# Patient Record
(demographics unavailable — no encounter records)

---

## 2025-02-24 NOTE — REVIEW OF SYSTEMS
[Palpitations] : palpitations [SOB on Exertion] : shortness of breath during exertion [Dizziness] : dizziness [Anxiety] : anxiety [Negative] : Allergic/Immunologic [FreeTextEntry5] : on and off  [de-identified] : on and off

## 2025-02-24 NOTE — HISTORY OF PRESENT ILLNESS
[Disease:__________________________] : Disease: [unfilled] [de-identified] : 40 yo F presents to the office today for initial consultation for Anemia. Referred by Self. Pt has a PMHx of Post Partum Anxiety,  Pt has a PSHx D&C, Appendectomy, Left Ovary removal. Pt has a FHx of Mother Uterine Cancer, Breast Cancer, Lung Nodule Cancer, Father Hypertension  Social History: socially drinker, non-smoker, , 1 child, Nurse Transitional Care team    This is the first time she was told she was anemic She was first time during her pregnancy  She had a post-Partum Hemorrhage after giving birth  She states her Fertility doctor wants us to also check for Coagulation Abnormalities, such as PT/APPT, Factor 5 Liden  She has taken PO Iron before and is tolerating  She had a BLT and Iron Infusion in 3/2024 and received 2 units of pRBC and 2 Iron Infusions  Menstrual cycle- irregular, before the baby it was 5-7 days, now 10+ days, before 3 days now it's almost everyday of heavy bleeding Medication- See Med list  Allergies- PCN theresa, Cipro Rash  Diet- Has a well balance diet eating green vegetables, red meats   Pt states SOB on exertion, dizziness on and off, palpitations on and off  Pt denies fever, diarrhea, fatigue, chills, nausea, vomiting, dyspnea, unintentional weight loss or bleeding. Pt denies headache, weakness, Pica.  Pt has not seen any blood in the stools or melena. No epistaxis, hematemesis or hemoptysis.   Healthcare Maintenace: PCP- Dr.Girgis JOHN-   GI- Will find one  Colonoscopy- never Upper Endoscopy- - Hital Hernia  Mammography- Due for one  GYN Pelvic Exam/pap- 2025 Breast Exam- 2024, self  LMP- 25  GPA- A4 (4 miscarriages around 8-10 weeks)   Labs on 25 reviewed and discussed with patient. WBC 6.7, Hgb 10, Hct 33.4, , MCV 83 Ferritin 18, Iron Serum 28, Iron sat 7, TIBC 426, UIBC 398, B12 573, Folate 20

## 2025-02-24 NOTE — HISTORY OF PRESENT ILLNESS
[Disease:__________________________] : Disease: [unfilled] [de-identified] : 40 yo F presents to the office today for initial consultation for Anemia. Referred by Self. Pt has a PMHx of Post Partum Anxiety,  Pt has a PSHx D&C, Appendectomy, Left Ovary removal. Pt has a FHx of Mother Uterine Cancer, Breast Cancer, Lung Nodule Cancer, Father Hypertension  Social History: socially drinker, non-smoker, , 1 child, Nurse Transitional Care team    This is the first time she was told she was anemic She was first time during her pregnancy  She had a post-Partum Hemorrhage after giving birth  She states her Fertility doctor wants us to also check for Coagulation Abnormalities, such as PT/APPT, Factor 5 Liden  She has taken PO Iron before and is tolerating  She had a BLT and Iron Infusion in 3/2024 and received 2 units of pRBC and 2 Iron Infusions  Menstrual cycle- irregular, before the baby it was 5-7 days, now 10+ days, before 3 days now it's almost everyday of heavy bleeding Medication- See Med list  Allergies- PCN theresa, Cipro Rash  Diet- Has a well balance diet eating green vegetables, red meats   Pt states SOB on exertion, dizziness on and off, palpitations on and off  Pt denies fever, diarrhea, fatigue, chills, nausea, vomiting, dyspnea, unintentional weight loss or bleeding. Pt denies headache, weakness, Pica.  Pt has not seen any blood in the stools or melena. No epistaxis, hematemesis or hemoptysis.   Healthcare Maintenace: PCP- Dr.Girgis JOHN-   GI- Will find one  Colonoscopy- never Upper Endoscopy- - Hital Hernia  Mammography- Due for one  GYN Pelvic Exam/pap- 2025 Breast Exam- 2024, self  LMP- 25  GPA- A4 (4 miscarriages around 8-10 weeks)   Labs on 25 reviewed and discussed with patient. WBC 6.7, Hgb 10, Hct 33.4, , MCV 83 Ferritin 18, Iron Serum 28, Iron sat 7, TIBC 426, UIBC 398, B12 573, Folate 20

## 2025-02-24 NOTE — ASSESSMENT
[FreeTextEntry1] :  38 yo F presents to the office today for initial consultation for Anemia. Referred by Self. This is the first time she was told she was anemic. She was first time during her pregnancy. She had a post-Partum Hemorrhage after giving birth. She states her Fertility doctor wants us to also check for Coagulation Abnormalities, such as PT/APPT, Factor 5 Liden. She has taken PO Iron before and is tolerating. She had a BLT and Iron Infusion in 3/2024 and received 2 units of pRBC and 2 Iron Infusions. Menstrual cycle- irregular, before the baby it was 5-7 days, now 10+ days, before 3 days now it's almost everyday of heavy bleeding. Diet- Has a well balance diet eating green vegetables, red meats. Pt states SOB on exertion, dizziness on and off, palpitations on and off.    Impression: Anemia    Plan: Previous chart and previous labs reviewed. Labs on 2/5/25 reviewed and discussed with patient. WBC 6.7, Hgb 10, Hct 33.4, , MCV 83 Ferritin 18, Iron Serum 28, Iron sat 7, TIBC 426, UIBC 398, B12 573, Folate 20. --- Today ordered labs: PT, INR, PTT, Factor V Liden, Antithrombin III Assay, DRVVT, PT gene, Silica Clotting Time --- Labs to be done 3 days when they RTC: CBC, Ferritin, TIBC  --- Schedule for Iron Infusion Venofer 200mg IV weekly x5  --- Will call pt with lab results  --- Pt was educated on a high iron diet --- Pt advised to follow up with their OBGYN, PCP, GI as directed We explained possible side effect from Iron infusion is not limited to anaphylactic reaction, headache, hives, itching wheezing, difficulty breathing, a lightheaded feeling, swelling of face, lips, tongue or throat, delay reaction and abdominal discomfort. All questions were answered   RTC in 3 months with ERICH Carlos

## 2025-02-24 NOTE — REVIEW OF SYSTEMS
[Palpitations] : palpitations [SOB on Exertion] : shortness of breath during exertion [Dizziness] : dizziness [Anxiety] : anxiety [Negative] : Allergic/Immunologic [FreeTextEntry5] : on and off  [de-identified] : on and off

## 2025-02-24 NOTE — ASSESSMENT
[FreeTextEntry1] :  40 yo F presents to the office today for initial consultation for Anemia. Referred by Self. This is the first time she was told she was anemic. She was first time during her pregnancy. She had a post-Partum Hemorrhage after giving birth. She states her Fertility doctor wants us to also check for Coagulation Abnormalities, such as PT/APPT, Factor 5 Liden. She has taken PO Iron before and is tolerating. She had a BLT and Iron Infusion in 3/2024 and received 2 units of pRBC and 2 Iron Infusions. Menstrual cycle- irregular, before the baby it was 5-7 days, now 10+ days, before 3 days now it's almost everyday of heavy bleeding. Diet- Has a well balance diet eating green vegetables, red meats. Pt states SOB on exertion, dizziness on and off, palpitations on and off.    Impression: Anemia    Plan: Previous chart and previous labs reviewed. Labs on 2/5/25 reviewed and discussed with patient. WBC 6.7, Hgb 10, Hct 33.4, , MCV 83 Ferritin 18, Iron Serum 28, Iron sat 7, TIBC 426, UIBC 398, B12 573, Folate 20. --- Today ordered labs: PT, INR, PTT, Factor V Liden, Antithrombin III Assay, DRVVT, PT gene, Silica Clotting Time --- Labs to be done 3 days when they RTC: CBC, Ferritin, TIBC  --- Schedule for Iron Infusion Venofer 200mg IV weekly x5  --- Will call pt with lab results  --- Pt was educated on a high iron diet --- Pt advised to follow up with their OBGYN, PCP, GI as directed We explained possible side effect from Iron infusion is not limited to anaphylactic reaction, headache, hives, itching wheezing, difficulty breathing, a lightheaded feeling, swelling of face, lips, tongue or throat, delay reaction and abdominal discomfort. All questions were answered   RTC in 3 months with ERICH Carlos

## 2025-02-26 NOTE — DISCUSSION/SUMMARY
[FreeTextEntry1] : REASON FOR VISIT: Ms. Monica Yung is a 39-year-old female who was referred by Dr. Mel Hardy for cancer genetic counseling and risk assessment due to a family history of cancer. The patient was seen on 2025 through [facility]. The patient was unaccompanied.   RELEVANT MEDICAL AND SURGICAL HISTORY: Patient had been experiencing heavy menses. Placental site nodule noted in  or 10/2025 leftover after her daughter's pregnancy. She had a D&C 10/28/25 which was negative but continued bleeding. She continued bleeding after the D&C, was put on Progesterone which helped with the bleeding. Also past history of 5 D&Cs for uterine polyps. She has a history of IVF transfers which were unsuccessful but she was able to conceive her daughter naturally. She desires to have more children and has frozen embryos from when she was 34.  OTHER MEDICAL AND SURGICAL HISTORY: - Unilateral oophorectomy age 13 for large ovarian cyst which led to ovarian torsion  - Appendectomy - Postpartum hemorrhage and had blood transfusion 1 year ago, 2-3 subsequent iron infusions - Postpartum anxiety and hypertension   PAST OB/GYN HISTORY: Obstetrical History:  Had 3-4 early chemical pregnancies and conceived naturally. Embryos frozen at age 34. Age at Menarche: 12 Pre-Menopausal Age at First Live Birth: 38 Oral Contraceptive Use: Former use for 10 years after ovary was removed at age 13  Hormone Replacement Therapy: Progesterone, and IVF treatments   CANCER SCREENING HISTORY: Breast: Last clinical breast exam was several years ago. No breast imaging. GYN: See comments above. Colon: Denied  Skin: Moles removed per patient benign.   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   FAMILY HISTORY: Paternal ancestry was reported as Guamanian/[place] Timorese/Wolof/English and maternal ancestry was reported as Polish. A detailed family history of cancer was ascertained, see below for pedigree. Of note: - Mother with endometrial cancer at 53, breast cancer at 68 and neuroendocrine lung cancer at 74 -  Maternal uncle with colon cancer at 55 - Maternal aunt with brain cancer at 57 - Maternal grandmother with gallbladder cancer - Maternal great grandfather colon cancer  age 63 - Maternal great aunt breast cancer 70s - Maternal great uncle pancreatic cancer  age 49 - Maternal gale cousin-once-removed with rare stomach cancer  age 48 - Maternal female cousin-once-removed with sarcoma uterine cancer  age 53 - Maternal female cousin-once-removed with breast cancer dx age 66  -  Paternal uncle with prostate cancer at 70 -  Paternal grandmother with breast cancer in her 70s/80s and colon cancer at 86   The remaining family history is unremarkable. According to the patient there are no other known cases of significant cancers in the family. To her knowledge no one else in the family has had germline testing for cancer susceptibility.   RISK ASSESSMENT: Ms. Yung's family history of cancer is suggestive of an inherited predisposition to breast, uterine and colorectal cancer and related cancers. Variants in breast, uterine and colorectal cancer susceptibility genes were of specific concern. The patient was encouraged to fwd along her mother's genetic report. If her mother tested negative for a comprehensive panel 8y ago then there wouldn't be anything to test her for from her maternal family. The patient said she would try to obtain the report but still desired to pursue testing. This test analyzes the following genes: AIP, ALK, APC, SOY, AXIN2, BAP1, BARD1, BMPR1A, BRCA1, BRCA2, BRIP1, CDC73, CDH1, CDK4, CDKN1B, CDKN2A, CHEK2, CTNNA1, DICER1, EGFR, EGLN1, EPCAM, FH, FLCN, GREM1, HOXB13, KIF1B, KIT, LZTR1, MAX, MEN1, MET, MITF, MLH1, MSH2, MSH3, MSH6, MUTYH, NF1, NF2, NTHL1, PALB2, PDGFRA, PHOX2B, PMS2, POLD1, POLE, POT1, UQJFD9S, PTCH1, PTEN, RAD51C, RAD51D, RB1, RET, SDHA, SDHAF2, SDHB, SDHC, SDHD, SMAD4, SMARCA4, SMARCB1, SMARCE1, STK11, SUFU, [id number], TP53, TSC1, TSC2, VHL   We discussed the risks, benefits and limitations, financial cost and implications of genetic testing. We also discussed the psychosocial implications of genetic testing. Possible test results were reviewed with the patient, along with associated medical management options.   The patient consented to genetic testing for hereditary cancer. A sample was obtained from the patient in our clinic and will be sent to [facility] for analysis.   PLAN: 1. The patient's sample will be sent to [facility] for analysis. 2. We will contact the patient once the results are available. Results generally return in 2-3 weeks.   For any additional questions please call Cancer Genetics at [phone] or [phone].     Qing Duran MS, [facility] [place] Genetic Counselor, Cancer Genetics

## 2025-02-26 NOTE — DISCUSSION/SUMMARY
[FreeTextEntry1] : REASON FOR VISIT: Ms. Monica Yung is a 39-year-old female who was referred by Dr. Mel Hardy for cancer genetic counseling and risk assessment due to a family history of cancer. The patient was seen on 2025 through [facility]. The patient was unaccompanied.   RELEVANT MEDICAL AND SURGICAL HISTORY: Patient had been experiencing heavy menses. Placental site nodule noted in  or 10/2025 leftover after her daughter's pregnancy. She had a D&C 10/28/25 which was negative but continued bleeding. She continued bleeding after the D&C, was put on Progesterone which helped with the bleeding. Also past history of 5 D&Cs for uterine polyps. She has a history of IVF transfers which were unsuccessful but she was able to conceive her daughter naturally. She desires to have more children and has frozen embryos from when she was 34.  OTHER MEDICAL AND SURGICAL HISTORY: - Unilateral oophorectomy age 13 for large ovarian cyst which led to ovarian torsion  - Appendectomy - Postpartum hemorrhage and had blood transfusion 1 year ago, 2-3 subsequent iron infusions - Postpartum anxiety and hypertension   PAST OB/GYN HISTORY: Obstetrical History:  Had 3-4 early chemical pregnancies and conceived naturally. Embryos frozen at age 34. Age at Menarche: 12 Pre-Menopausal Age at First Live Birth: 38 Oral Contraceptive Use: Former use for 10 years after ovary was removed at age 13  Hormone Replacement Therapy: Progesterone, and IVF treatments   CANCER SCREENING HISTORY: Breast: Last clinical breast exam was several years ago. No breast imaging. GYN: See comments above. Colon: Denied  Skin: Moles removed per patient benign.   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   FAMILY HISTORY: Paternal ancestry was reported as Salvadorean/Emirati/Greek/English and maternal ancestry was reported as Polish. A detailed family history of cancer was ascertained, see below for pedigree. Of note: - Mother with endometrial cancer at 53, breast cancer at 68 and neuroendocrine lung cancer at 74 -  Maternal uncle with colon cancer at 55 - Maternal aunt with brain cancer at 57 - Maternal grandmother with gallbladder cancer - Maternal great grandfather colon cancer  age 63 - Maternal great aunt breast cancer 70s - Maternal great uncle pancreatic cancer  age 49 - Maternal gale cousin-once-removed with rare stomach cancer  age 48 - Maternal female cousin-once-removed with sarcoma uterine cancer  age 53 - Maternal female cousin-once-removed with breast cancer dx age 66  -  Paternal uncle with prostate cancer at 70 -  Paternal grandmother with breast cancer in her 70s/80s and colon cancer at 86   The remaining family history is unremarkable. According to the patient there are no other known cases of significant cancers in the family. To her knowledge no one else in the family has had germline testing for cancer susceptibility.   RISK ASSESSMENT: Ms. Yung's family history of cancer is suggestive of an inherited predisposition to breast, uterine and colorectal cancer and related cancers. Variants in breast, uterine and colorectal cancer susceptibility genes were of specific concern. The patient was encouraged to fwd along her mother's genetic report. If her mother tested negative for a comprehensive panel 8y ago then there wouldn't be anything to test her for from her maternal family. The patient said she would try to obtain the report but still desired to pursue testing. This test analyzes the following genes: AIP, ALK, APC, SOY, AXIN2, BAP1, BARD1, BMPR1A, BRCA1, BRCA2, BRIP1, CDC73, CDH1, CDK4, CDKN1B, CDKN2A, CHEK2, CTNNA1, DICER1, EGFR, EGLN1, EPCAM, FH, FLCN, GREM1, HOXB13, KIF1B, KIT, LZTR1, MAX, MEN1, MET, MITF, MLH1, MSH2, MSH3, MSH6, MUTYH, NF1, NF2, NTHL1, PALB2, PDGFRA, PHOX2B, PMS2, POLD1, POLE, POT1, [id number], PTCH1, PTEN, RAD51C, RAD51D, RB1, RET, SDHA, SDHAF2, SDHB, SDHC, SDHD, SMAD4, SMARCA4, SMARCB1, SMARCE1, STK11, SUFU, [id number], TP53, TSC1, TSC2, VHL   We discussed the risks, benefits and limitations, financial cost and implications of genetic testing. We also discussed the psychosocial implications of genetic testing. Possible test results were reviewed with the patient, along with associated medical management options.   The patient consented to genetic testing for hereditary cancer. A sample was obtained from the patient in our clinic and will be sent to [facility] for analysis.   PLAN: 1. The patient's sample will be sent to [facility] for analysis. 2. We will contact the patient once the results are available. Results generally return in 2-3 weeks.   For any additional questions please call Cancer Genetics at [phone] or [phone].     Qing Duran MS, [facility] [place] Genetic Counselor, Cancer Genetics

## 2025-03-27 NOTE — DISCUSSION/SUMMARY
[FreeTextEntry1] : REASON FOR VISIT: Ms. Monica Yung is a 39-year-old female who was called for a discussion regarding her negative genetic test results related to hereditary cancer predisposition. The patient did not answer the phone and a voicemail was left for her requesting a callback.     RELEVANT MEDICAL AND SURGICAL HISTORY: Patient had been experiencing heavy menses. Placental site nodule noted in  or 10/2025 leftover after her daughter's pregnancy. She had a D&C 10/28/25 which was negative but continued bleeding. She continued bleeding after the D&C, was put on Progesterone which helped with the bleeding. Also past history of 5 D&Cs for uterine polyps. She has a history of IVF transfers which were unsuccessful but she was able to conceive her daughter naturally. She desires to have more children and has frozen embryos from when she was 34.  OTHER MEDICAL AND SURGICAL HISTORY: - Unilateral oophorectomy age 13 for large ovarian cyst which led to ovarian torsion - Appendectomy - Postpartum hemorrhage and had blood transfusion 1 year ago, 2-3 subsequent iron infusions - Postpartum anxiety and hypertension  PAST OB/GYN HISTORY: Obstetrical History:  Had 3-4 early chemical pregnancies and conceived naturally. Embryos frozen at age 34. Age at Menarche: 12 Pre-Menopausal Age at First Live Birth: 38 Oral Contraceptive Use: Former use for 10 years after ovary was removed at age 13 Hormone Replacement Therapy: Progesterone, and IVF treatments  CANCER SCREENING HISTORY: Breast: Last clinical breast exam was several years ago. No breast imaging. GYN: See comments above. Colon: Denied Skin: Moles removed per patient benign.  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  FAMILY HISTORY: Paternal ancestry was reported as Macedonian/Macedonian/Luxembourgish/English and maternal ancestry was reported as Polish. A detailed family history of cancer was ascertained, see below for pedigree. Of note: - Mother with endometrial cancer at 53, breast cancer at 68 and neuroendocrine lung cancer at 74 - Maternal uncle with colon cancer at 55 - Maternal aunt with brain cancer at 57 - Maternal grandmother with gallbladder cancer - Maternal great grandfather colon cancer  age 63 - Maternal great aunt breast cancer 70s - Maternal great uncle pancreatic cancer  age 49 - Maternal gale cousin-once-removed with rare stomach cancer  age 48 - Maternal female cousin-once-removed with sarcoma uterine cancer  age 53 - Maternal female cousin-once-removed with breast cancer dx age 66 - Paternal uncle with prostate cancer at 70 - Paternal grandmother with breast cancer in her 70s/80s and colon cancer at 86  The remaining family history is unremarkable. According to the patient there are no other known cases of significant cancers in the family. To her knowledge no one else in the family has had germline testing for cancer susceptibility.    TEST RESULTS: NEGATIVE   NO pathogenic (disease-causing) variants or variants of uncertain significance were detected in the following genes:  AIP, ALK, APC, SOY, AXIN2, BAP1, BARD1, BMPR1A, BRCA1, BRCA2, BRIP1, CDC73, CDH1, CDK4, CDKN1B, CDKN2A, CHEK2, CTNNA1, DICER1, EGFR, EGLN1, EPCAM, FH, FLCN, GREM1, HOXB13, KIF1B, KIT, LZTR1, MAX, MEN1, MET, MITF, MLH1, MSH2, MSH3, MSH6, MUTYH, NF1, NF2, NTHL1, PALB2, PDGFRA, PHOX2B, PMS2, POLD1, POLE, POT1, GVVHJ2V, PTCH1, PTEN, RAD51C, RAD51D, RB1, RET, SDHA, SDHAF2, SDHB, SDHC, SDHD, SMAD4, SMARCA4, SMARCB1, SMARCE1, STK11, SUFU, [id number], TP53, TSC1, TSC2, VHL   RESULTS INTERPRETATION AND ASSESSMENT: Given Ms. Yung's current reported family history of cancer, and her negative genetic test results, the following screening guidelines and risk-reducing recommendations were discussed: Breast: Despite her negative genetic test results, given Ms. Yung's reported family history and additional factors (e.g. OBGYN history, weight), she is thought to be at an increased risk for breast cancer (breast density was unavailable to include in the risk model). CHERI risk assessment demonstrates a 4.5% 10-year risk and 30.0% lifetime risk. This puts her in the high-risk category for breast cancer because she has a lifetime risk greater than 20%. Given this risk, we the patient may consider undergoing annual breast MRI in addition to annual screening mammogram with consideration of tomosynthesis. We also recommend she practice breast self-awareness. Patient was offered a consultation with Dr. Irby (Cancer Genetics Doctor) to review recommendations and create a management plan. The schedulers will reach out to the patient to set up a follow-up appointment. Complete note with recommendations will be dictated after the follow-up. Other: In the absence of other indications, the patient should practice age-appropriate cancer screening for all other organ systems as recommended for the general population.   It is recommended that the patient discuss the importance of pursuing cancer genetic testing and counseling with her other relatives. There may be a pathogenic variant in the family which the patient did not inherit. We would be happy to meet with her family members or refer them to a genetic expert in their area.   We also discussed that, while no clear cause of the patient's family history of cancer was identified, this result, while reassuring, does not entirely rule out a hereditary cancer risk in the patient. It is possible the patient has a mutation in one of the genes tested that is not detectable by this analysis, or has a mutation in a different gene, either known or unknown. It is also possible there is a hereditary cancer predisposition in the family, but the patient did not inherit it.   Our knowledge of genetics and inherited cancer conditions is changing rapidly, therefore, we recommend that the patient contact our office, every 2 to 3 years, to discuss relevant advances in cancer genetics. We emphasize the importance of re-contacting us with updates regarding her personal and family history of cancer as well as any updates regarding additional cancer genetic test results performed for the patient and/or family members.  Such updates could possibly change our risk assessment and recommendations.   PLAN: 1. Long-term management and surveillance should be based on the patient's personal and family history and general population guidelines for all other cancers. 2. A copy of the genetic test results is available to the patient in the [facility] portal. 3. The patient was encouraged to contact us every 2-3 years to discuss relevant advances in cancer genetics, or sooner if there are any changes in her personal or family history of cancer.   For any additional questions please call Cancer Genetics at [phone] or [phone].   Qing Duran MS, [facility] [place] Genetic Counselor, Cancer Genetics

## 2025-05-20 NOTE — HISTORY OF PRESENT ILLNESS
[Disease:__________________________] : Disease: [unfilled] [de-identified] : 40 yo F presents to the office today for initial consultation for Anemia. Referred by Self. Pt has a PMHx of Post Partum Anxiety,  Pt has a PSHx D&C, Appendectomy, Left Ovary removal. Pt has a FHx of Mother Uterine Cancer, Breast Cancer, Lung Nodule Cancer, Father Hypertension  Social History: socially drinker, non-smoker, , 1 child, Nurse Transitional Care team    This is the first time she was told she was anemic She was first time during her pregnancy  She had a post-Partum Hemorrhage after giving birth  She states her Fertility doctor wants us to also check for Coagulation Abnormalities, such as PT/APPT, Factor 5 Liden  She has taken PO Iron before and is tolerating  She had a BLT and Iron Infusion in 3/2024 and received 2 units of pRBC and 2 Iron Infusions  Menstrual cycle- irregular, before the baby it was 5-7 days, now 10+ days, before 3 days now it's almost everyday of heavy bleeding Medication- See Med list  Allergies- PCN theresa, Cipro Rash  Diet- Has a well balance diet eating green vegetables, red meats   Pt states SOB on exertion, dizziness on and off, palpitations on and off  Pt denies fever, diarrhea, fatigue, chills, nausea, vomiting, dyspnea, unintentional weight loss or bleeding. Pt denies headache, weakness, Pica.  Pt has not seen any blood in the stools or melena. No epistaxis, hematemesis or hemoptysis.   Healthcare Maintenace: PCP- Dr.Girgis JOHN-   GI- Will find one  Colonoscopy- never Upper Endoscopy- - Hital Hernia  Mammography- Due for one  GYN Pelvic Exam/pap- 2025 Breast Exam- 2024, self  LMP- 25  GPA- A4 (4 miscarriages around 8-10 weeks)   Labs on 25 reviewed and discussed with patient. WBC 6.7, Hgb 10, Hct 33.4, , MCV 83 Ferritin 18, Iron Serum 28, Iron sat 7, TIBC 426, UIBC 398, B12 573, Folate 20 [de-identified] : 5/19/25 41 yo F presents to the office today for follow up for Anemia s/p Venofer 200mg IV weekly x5, 5/5, last dose on 4/1/25  She is feeling much better after the infusions She is complaing of acid reflux whixh she is going to see GI for She is getting a script from OBGYN to do a Mammogram LMP- 4/19/25, lasted 5 days  Pt states mild fatigue  Pt denies fever, diarrhea, chills, nausea, vomiting, SOB, dyspnea, unintentional weight loss or bleeding. Pt denies palpitations, headache, weakness, dizziness, Pica.  Pt has not seen any blood in the stools or melena. No epistaxis, hematemesis or hemoptysis. Labs on 5/16/25 reviewed and discussed with patient. WBC 7.18, Hgb 12.9, Hct 38.5, , MCV 83.5 Ferritin 141, Iron Serum 85, Iron sat 27, TIBC 317, UIBC 232

## 2025-05-20 NOTE — HISTORY OF PRESENT ILLNESS
[Disease:__________________________] : Disease: [unfilled] [de-identified] : 38 yo F presents to the office today for initial consultation for Anemia. Referred by Self. Pt has a PMHx of Post Partum Anxiety,  Pt has a PSHx D&C, Appendectomy, Left Ovary removal. Pt has a FHx of Mother Uterine Cancer, Breast Cancer, Lung Nodule Cancer, Father Hypertension  Social History: socially drinker, non-smoker, , 1 child, Nurse Transitional Care team    This is the first time she was told she was anemic She was first time during her pregnancy  She had a post-Partum Hemorrhage after giving birth  She states her Fertility doctor wants us to also check for Coagulation Abnormalities, such as PT/APPT, Factor 5 Liden  She has taken PO Iron before and is tolerating  She had a BLT and Iron Infusion in 3/2024 and received 2 units of pRBC and 2 Iron Infusions  Menstrual cycle- irregular, before the baby it was 5-7 days, now 10+ days, before 3 days now it's almost everyday of heavy bleeding Medication- See Med list  Allergies- PCN theresa, Cipro Rash  Diet- Has a well balance diet eating green vegetables, red meats   Pt states SOB on exertion, dizziness on and off, palpitations on and off  Pt denies fever, diarrhea, fatigue, chills, nausea, vomiting, dyspnea, unintentional weight loss or bleeding. Pt denies headache, weakness, Pica.  Pt has not seen any blood in the stools or melena. No epistaxis, hematemesis or hemoptysis.   Healthcare Maintenace: PCP- Dr.Girgis JOHN-   GI- Will find one  Colonoscopy- never Upper Endoscopy- - Hital Hernia  Mammography- Due for one  GYN Pelvic Exam/pap- 2025 Breast Exam- 2024, self  LMP- 25  GPA- A4 (4 miscarriages around 8-10 weeks)   Labs on 25 reviewed and discussed with patient. WBC 6.7, Hgb 10, Hct 33.4, , MCV 83 Ferritin 18, Iron Serum 28, Iron sat 7, TIBC 426, UIBC 398, B12 573, Folate 20 [de-identified] : 5/19/25 41 yo F presents to the office today for follow up for Anemia s/p Venofer 200mg IV weekly x5, 5/5, last dose on 4/1/25  She is feeling much better after the infusions She is complaing of acid reflux whixh she is going to see GI for She is getting a script from OBGYN to do a Mammogram LMP- 4/19/25, lasted 5 days  Pt states mild fatigue  Pt denies fever, diarrhea, chills, nausea, vomiting, SOB, dyspnea, unintentional weight loss or bleeding. Pt denies palpitations, headache, weakness, dizziness, Pica.  Pt has not seen any blood in the stools or melena. No epistaxis, hematemesis or hemoptysis. Labs on 5/16/25 reviewed and discussed with patient. WBC 7.18, Hgb 12.9, Hct 38.5, , MCV 83.5 Ferritin 141, Iron Serum 85, Iron sat 27, TIBC 317, UIBC 232

## 2025-05-20 NOTE — REVIEW OF SYSTEMS
[Palpitations] : palpitations [SOB on Exertion] : shortness of breath during exertion [Dizziness] : dizziness [Anxiety] : anxiety [FreeTextEntry5] : on and off  [de-identified] : on and off  [Fatigue] : fatigue [Negative] : Neurological [FreeTextEntry2] : mild

## 2025-05-20 NOTE — REVIEW OF SYSTEMS
[Palpitations] : palpitations [SOB on Exertion] : shortness of breath during exertion [Dizziness] : dizziness [Anxiety] : anxiety [FreeTextEntry5] : on and off  [de-identified] : on and off  [Fatigue] : fatigue [Negative] : Neurological [FreeTextEntry2] : mild

## 2025-05-20 NOTE — ASSESSMENT
[FreeTextEntry1] :  40 yo F presents to the office today for initial consultation for Anemia. Referred by Self. This is the first time she was told she was anemic. She was first time during her pregnancy. She had a post-Partum Hemorrhage after giving birth. She states her Fertility doctor wants us to also check for Coagulation Abnormalities, such as PT/APPT, Factor 5 Liden. She has taken PO Iron before and is tolerating. She had a BLT and Iron Infusion in 3/2024 and received 2 units of pRBC and 2 Iron Infusions. Menstrual cycle- irregular, before the baby it was 5-7 days, now 10+ days, before 3 days now it's almost everyday of heavy bleeding. Diet- Has a well balance diet eating green vegetables, red meats. Pt states SOB on exertion, dizziness on and off, palpitations on and off.    Impression: Anemia    Plan: Previous chart and previous labs reviewed. Labs on 5/16/25 reviewed and discussed with patient. WBC 7.18, Hgb 12.9, Hct 38.5, , MCV 83.5 Ferritin 141, Iron Serum 85, Iron sat 27, TIBC 317, UIBC 232. --- Labs to be done 3 days when they RTC: CBC, Ferritin, TIBC, B12, Folate  --- s/p Venofer 200mg IV weekly x5, 5/5, last dose on 4/1/25 --- Hgb and Iron now stable --- Patient will start PO Iron Blood Builder  --- Pt was educated on a high iron diet --- Following up with GYN to get Mammogram done --- Following up with GI for Acid reflux  --- Pt advised to follow up with their OBGYN, PCP, GI as directed We explained possible side effect from Iron infusion is not limited to anaphylactic reaction, headache, hives, itching wheezing, difficulty breathing, a lightheaded feeling, swelling of face, lips, tongue or throat, delay reaction and abdominal discomfort. All questions were answered   RTC in 6 months with ERICH Carlos